# Patient Record
Sex: MALE | Race: WHITE | NOT HISPANIC OR LATINO | Employment: FULL TIME | URBAN - NONMETROPOLITAN AREA
[De-identification: names, ages, dates, MRNs, and addresses within clinical notes are randomized per-mention and may not be internally consistent; named-entity substitution may affect disease eponyms.]

---

## 2024-01-23 ENCOUNTER — NON-PROVIDER VISIT (OUTPATIENT)
Dept: URGENT CARE | Facility: CLINIC | Age: 24
End: 2024-01-23

## 2024-01-23 DIAGNOSIS — Z02.1 PRE-EMPLOYMENT DRUG SCREENING: ICD-10-CM

## 2024-01-23 LAB
AMP AMPHETAMINE: NORMAL
COC COCAINE: NORMAL
INT CON NEG: NORMAL
INT CON POS: NORMAL
MET METHAMPHETAMINES: NORMAL
OPI OPIATES: NORMAL
PCP PHENCYCLIDINE: NORMAL
POC DRUG COMMENT 753798-OCCUPATIONAL HEALTH: NEGATIVE
THC: NORMAL

## 2024-01-23 PROCEDURE — 80305 DRUG TEST PRSMV DIR OPT OBS: CPT

## 2024-01-23 NOTE — PROGRESS NOTES
Bereket uSarez is a 23 y.o. male here for a non-provider visit? yes    If abnormal was an in office provider notified today (if so, indicate provider)? No    Routed to PCP? No    Patient came in for 6 panel uds    Pre employment

## 2024-04-22 ENCOUNTER — APPOINTMENT (OUTPATIENT)
Dept: URGENT CARE | Facility: CLINIC | Age: 24
End: 2024-04-22

## 2024-04-22 ENCOUNTER — NON-PROVIDER VISIT (OUTPATIENT)
Dept: URGENT CARE | Facility: CLINIC | Age: 24
End: 2024-04-22

## 2024-04-22 DIAGNOSIS — Z11.1 PPD SCREENING TEST: ICD-10-CM

## 2024-04-22 PROCEDURE — 86580 TB INTRADERMAL TEST: CPT | Performed by: PHYSICIAN ASSISTANT

## 2024-04-22 NOTE — PROGRESS NOTES
Bereket Suarez is a 23 y.o. male here for a non-provider visit for PPD placement -- Step 1 of 1    Reason for PPD:  work requirement    1. TB evaluation questionnaire completed by patient? Yes      -  If any answers marked yes did you contact a provider prior to placing? No  2.  Patient notified to return to clinic for reading on:   On 4/24/2024 after 2:01 pm  3.  PPD Placement documentation completed on TB evaluation questionnaire? yes  4.  Location of TB evaluation questionnaire filed:at [facility] desk in yellow folder

## 2024-04-24 ENCOUNTER — NON-PROVIDER VISIT (OUTPATIENT)
Dept: URGENT CARE | Facility: CLINIC | Age: 24
End: 2024-04-24

## 2024-04-24 LAB — TB WHEAL 3D P 5 TU DIAM: 0 MM

## 2024-04-24 NOTE — PROGRESS NOTES
Bereket Suarez is a 23 y.o. male here for a non-provider visit for PPD reading -- Step 1 of 1.      1.  Resulted in Epic under enter/edit results? Yes   2.  TB evaluation questionnaire scanned into chart and original given to patient?Yes      3. Was induration greater than 0 mm? No.